# Patient Record
Sex: MALE
[De-identification: names, ages, dates, MRNs, and addresses within clinical notes are randomized per-mention and may not be internally consistent; named-entity substitution may affect disease eponyms.]

---

## 2021-11-05 ENCOUNTER — NURSE TRIAGE (OUTPATIENT)
Dept: OTHER | Facility: CLINIC | Age: 56
End: 2021-11-05

## 2021-11-05 NOTE — TELEPHONE ENCOUNTER
Brief description of triage: has been calling his doctor today and not getting a call back, is diabetic, had back surgery 9/23/21, 11/2/21 started having drainage from the incision, started as clear, now it pink and grey, started a fever yesterday, 100.7, advised wife to take him to the ER. Triage indicates for patient to the ER     Care advice provided, patient verbalizes understanding; denies any other questions or concerns; instructed to call back for any new or worsening symptoms. This triage is a result of a call to [address] [place] [address]. Please do not respond to the triage nurse through this encounter. Any subsequent communication should be directly with the patient. Reason for Disposition   Looks infected (spreading redness, red streak, pus) and fever    Answer Assessment - Initial Assessment Questions  1. LOCATION: \"Where is the wound located? \"       Back     2. WOUND APPEARANCE: \"What does the wound look like? \"       Oozing pink and grey fluid    3. SIZE: If redness is present, ask: \"What is the size of the red area? \" (Inches, centimeters, or compare to size of a coin)       Denies     4. SPREAD: \"What's changed in the last day? \"  \"Do you see any red streaks coming from the wound? \"      He went from no leak from surgery on 9/23/21 to oozing so much it wets the bed through the dressing     5. ONSET: \"When did it start to look infected? \"       Yesterday     6. MECHANISM: \"How did the wound start, what was the cause? \"      Surgical procedure     7. PAIN: \"Is there any pain? \" If so, ask: \"How bad is the pain? \"   (Scale 1-10; or mild, moderate, severe)      Did not state pain     8. FEVER: \"Do you have a fever? \" If so, ask: \"What is your temperature, how was it measured, and when did it start? \"      100.7    9. OTHER SYMPTOMS: \"Do you have any other symptoms? \" (e.g., shaking chills, weakness, rash elsewhere on body)      Falling     10.  PREGNANCY: \"Is there any chance you are pregnant? \" \"When was your last menstrual period? \"        N/a    Protocols used: WOUND INFECTION-ADULT-[place]